# Patient Record
Sex: FEMALE | NOT HISPANIC OR LATINO | URBAN - METROPOLITAN AREA
[De-identification: names, ages, dates, MRNs, and addresses within clinical notes are randomized per-mention and may not be internally consistent; named-entity substitution may affect disease eponyms.]

---

## 2019-05-02 ENCOUNTER — OFFICE VISIT CONVERTED (OUTPATIENT)
Dept: OTHER | Facility: HOSPITAL | Age: 59
End: 2019-05-02
Attending: INTERNAL MEDICINE

## 2021-05-28 VITALS
HEIGHT: 60 IN | DIASTOLIC BLOOD PRESSURE: 68 MMHG | WEIGHT: 111.7 LBS | OXYGEN SATURATION: 92 % | SYSTOLIC BLOOD PRESSURE: 124 MMHG | HEART RATE: 88 BPM | RESPIRATION RATE: 18 BRPM | BODY MASS INDEX: 21.93 KG/M2

## 2021-05-28 NOTE — PROGRESS NOTES
Patient: TRINH JONES     Acct: [id number]     Report: #[id number]  UNIT #: [id number]     : 1960    Encounter Date:2019  PRIMARY CARE: [facility]URIC[facility]  ***Signed***  --------------------------------------------------------------------------------------------------------------------  DATE: 19      Primary Care Provider:  MARTY FORTE      Referring Provider:  MARTY FORTE      Reason For Consult      NP Consult- Anemia            History of Present Illness      53-year-old white female was referred from the hospital after discharge because     of anemia.  Patient claims that she has been hospitalized twice within the past     year.  Her last hospitalization was secondary to bilateral pneumonia review of     her hospital records revealed that the patient on  had a white count of     8.9, hemoglobin 9.2 hematocrit of 29.8, macrocytic indicis, elevated platelet     count of 514,000, normal differential.  Her CMP  showed BUN of 6, potassium 2.2,    carbon dioxide 36, creatinine 0.47, calcium 7.5, total protein 7, albumin 3.4.      On 2019 patient  had a 25-hydroxy vitamin D level of 5 normal being .            Past Medical/Surgical History             No Hypertension             No Diabetes Mellitus             No Heart Disease             No Blood Clots             No Cancer             No Lung Disease             No Kidney Disease            Herniorrhaphy      Colon surgery for benign tumor      CLINT/BSO      4 childbirths      Cataract surgery            Pyschiatric History      Depression, Anxiety, Panic Attacks; No Bipolar, No Other            Social History      Social History:  Tobacco Use (2 ppd); No Alcohol Use, No Recreational Drug use,     No Other      Patient takes care of 11 cats and 11 dogs in her house.            Family History      No Hypertension, No Diabetes Mellitus; Heart Disease (Mother, Maternal     Grandmother); No Blood Clots; Cancer (Maternal  Grandfather- Colon); No Lung     Disease, No Kidney Disease, No Other            Allergies      Coded Allergies:             NO KNOWN ALLERGIES (Unverified , 2/13/17)            Medications      Medications    Last Reconciled on 5/2/19 18:24 by IRENE JOHNSON MD      Ibuprofen (Ibuprofen) 800 Mg Tablet      800 MG PO BID, #30 TAB 0 Refills         Reported         5/2/19       Folic Acid (Folic Acid*) 1 Mg Tablet      1 MG PO QDAY for 90 Days, #90 TAB 3 Refills         Prov: Veza,Julia         5/2/19       Cyanocobalamin (Vitamin B-12*) 1,000 Mcg Tablet      1000 MCG PO QDAY for 90 Days, #90 TAB 3 Refills         Prov: Veza,[place]         5/2/19       Ergocalciferol (Ergocalciferol*) 50,000 Unit Capsule      78372 UNITS PO FR@09, #8 CAP 0 Refills         Prov: Veza,[place]         5/2/19       Baclofen (BACLOFEN) 10 Mg Tablet      10 MG PO BID, #60 TAB 0 Refills         Reported         5/2/19       Calcium Carbonate/Vitamin D3 (OYSCO 500-VIT D3 200 TABLET) 1 Each Tablet      500 MG PO QDAY, TABLET         Reported         12/20/16       DULoxetine (Cymbalta) 60 Mg Capsule.dr      60 MG PO QDAY, #30 CAP 0 Refills         Reported         12/20/16      Current Medications      Current Medications Reviewed 5/2/19            Review of Systems      Abnormal as noted below; all other systems have been reviewed and are negative.      General:  Anxiety, Fatigue Scale: (10), Pain Scale: (8)      HEENT:  No Dysphagia; Hearing Changes, Tinnitus, Visual Changes (Light hurts her    eyes)      Respiratory:  Cough, Shortness of Air, Sputum Changes (green)      Cardiovascular:  No Chest Pain, No Pedal Edema; Palpitations      Gastrointestinal:  Nausea, Vomiting; No Dysphagia; Constipation, Appetite Good     (good)      Genitourinary:  Nocturia (2x); No Dysuria; Other (Frequent UTIs)      Musculoskeletal:  No Joint Effusions; Myalgias, Aches, Pains (eyes)      Endocrine:  No Heat Intolerance, No Cold Intolerance      Hematologic:   No Bleeding; Bruising (Easy)      Allergic/Immunologic:  No Hives, No Throat closing off      Psychological:  Anxiety, Depression      Neurological:  Headaches, Dizziness, Numbness ((R) Leg, foot)      Skin:  No Rash, No Open Wounds      Vitals:             Height 5 ft 0 in / 152.4 cm           Weight 111 lbs 11.2 oz / 50.912162 kg           BSA 1.46 m2           BMI 21.8 kg/m2           Pulse 88           Respirations 18           Blood Pressure 124/68           Pulse Oximetry 92%            Exam      Constitutional:  No acute distress, Conversant, Pleasant, Other (Unkempt)      Eyes:  Anicteric sclerae, Palpebral Conjunctivae (Pink), DUNCAN      HENT:  Oropharynx clear; No Erythema; Buccal mucosae (Pink)      Neck:  Supple, Full Range of Motion      Lungs:  Clear to Ausculation, Normal Respiratory Effort, Other (Diminished     breath sound)      Cardiovascular:  RRR; No Murmurs; Normal PMI; No Peripheral Edema      Abdomen:  Soft, NABS; No Masses, No Tenderness      Skin:  Normal temperature, Other (No dermatome)      Extremities:  No digital cyanosis, No digital ischemia, Pedal pulses intact,     Normal gait, Other (No deformity)      Psychiatric:  Appropriate affect, Intact judgement, AAO x 3      Neurological:  Cranial Nerve II-XII; No Focal Sensory deficits      Lymphatic:  No Neck            Lab Results      Dictated in history and physical            Impression/Problem List      Microcytic anemia may be nutritional, rule out thyroid dysfunction, rule out     myelodysplasia.  Patient has hepatomegaly and liver disease may  cause     macrocytosis.      Hepatomegaly on x-rays      Possible COPD            Plan      Patient was instructed to take vitamin B12 thousand micrograms per day as well     as folic acid 1 mg/day.  Next today she will have a CBC, CMP, reticulocyte     count, B12, folate level, thyroid profile, methylmalonic acid, homocystine     level.      Return in 8 weeks with CBC, reticulocyte count,  methylmalonic acid, homocystine     level.      Thank you very much for allowing me to participate in the care of your patient.     I will keep you posted on the progress of her workup.            Patient Education:        Anxiety and Panic Attacks (Alternative Therapy)            PREVENTION      2 or More Falls Past Year?:  No      Fall Past Year with Injury?:  No      Chart initiated by      HILLARY Cortez MA                 Disclaimer: Converted document may not contain table formatting or lab diagrams. Please see [employer] for the authenticated document.

## 2023-07-10 ENCOUNTER — TELEPHONE (OUTPATIENT)
Dept: NEUROLOGY | Facility: CLINIC | Age: 63
End: 2023-07-10

## 2023-07-31 ENCOUNTER — TELEPHONE (OUTPATIENT)
Dept: NEUROSURGERY | Facility: CLINIC | Age: 63
End: 2023-07-31
Payer: COMMERCIAL

## 2023-08-22 ENCOUNTER — OFFICE VISIT (OUTPATIENT)
Dept: NEUROLOGY | Facility: CLINIC | Age: 63
End: 2023-08-22
Payer: COMMERCIAL

## 2023-08-22 VITALS
DIASTOLIC BLOOD PRESSURE: 95 MMHG | HEIGHT: 60 IN | SYSTOLIC BLOOD PRESSURE: 117 MMHG | WEIGHT: 97 LBS | HEART RATE: 87 BPM | BODY MASS INDEX: 19.04 KG/M2

## 2023-08-22 DIAGNOSIS — G43.009 MIGRAINE WITHOUT AURA AND WITHOUT STATUS MIGRAINOSUS, NOT INTRACTABLE: Primary | ICD-10-CM

## 2023-08-22 PROCEDURE — 99203 OFFICE O/P NEW LOW 30 MIN: CPT | Performed by: PSYCHIATRY & NEUROLOGY

## 2023-08-22 RX ORDER — BACLOFEN 20 MG/1
10 TABLET ORAL 3 TIMES DAILY
COMMUNITY
Start: 2023-04-18

## 2023-08-22 RX ORDER — PAROXETINE HYDROCHLORIDE 20 MG/1
20 TABLET, FILM COATED ORAL EVERY MORNING
COMMUNITY
Start: 2023-06-16 | End: 2024-06-16

## 2023-08-22 RX ORDER — PROGESTERONE 100 MG/1
100 CAPSULE ORAL DAILY
COMMUNITY
Start: 2023-06-01 | End: 2024-06-01

## 2023-08-22 RX ORDER — IBUPROFEN 800 MG/1
800 TABLET ORAL EVERY 6 HOURS PRN
COMMUNITY
Start: 2023-07-13

## 2023-08-22 RX ORDER — OMEGA-3-ACID ETHYL ESTERS 1 G/1
2 CAPSULE, LIQUID FILLED ORAL DAILY
COMMUNITY
Start: 2023-07-31 | End: 2024-07-31

## 2023-08-22 RX ORDER — FREMANEZUMAB-VFRM 225 MG/1.5ML
225 INJECTION SUBCUTANEOUS
Qty: 1.68 ML | Refills: 5 | Status: SHIPPED | OUTPATIENT
Start: 2023-08-22

## 2023-08-22 NOTE — PROGRESS NOTES
"Chief Complaint  Dizziness and Altered Mental Status (/)    Subjective          Zayra Mcpherson is a 62 y.o. female who presents to [facility]  History of Present Illness  62-year-old woman evaluated for chronic headaches.  She states that she has had headaches since she was about 14 to 15 years old.  She states that her family has migraines including her [place] and parents.  She states that she gets a headache that is severe at least twice a month that can last for 3 to 4 days.  Headaches are usually frontal right greater than left and it is worse in her eyes.  It is described as pressure like something is pushing on her head and at times it can be throbbing in her temples.  There is associated light noise sensitivity and sometimes nausea.  She is incapacitated.  She gets a moderate headache on a daily basis and she takes Excedrin Migraine for this.  It usually will relieve it but it comes back again.  She states that she was taking Topamax in the past that was given to her by a neurologist.  It did not help her significantly.  She is taking Paxil at this time for depression.  She has a history of asthma and COPD.  She states that she has problems sleeping at night and she has sinus disease and keeps on sniffling.  She states that she needs to see an ENT.  Objective   Vital Signs:   /95   Pulse 87   Ht 152.4 cm (60\")   Wt 44 kg (97 lb)   BMI 18.94 kg/mý     Physical Exam   She is alert, fluent, phasic, follows commands well.  Optic is a normal, visual fields full, EOMs full directions gaze, facial strength is full, soft palate elevation and tongue are normal.  There is no weakness of the upper or lower extremities.  Reflexes are absent.  Cerebellar testing is intact.  Station gait is unremarkable.  Heart is regular rhythm normal in rate.        Assessment and Plan  Diagnoses and all orders for this visit:    1. Migraine without aura and without status " migrainosus, not intractable (Primary)  Assessment & Plan:  I discussed with her that she has chronic migraine headaches and she needs to stop taking Excedrin since it can cause medication overuse headaches.  She cannot take another antidepressant medication such as amitriptyline and she cannot take beta-blockers because of asthma and COPD.  She has tried topiramate in the past which was not effective for her.  I will give her Ajovy injection.  I explained to her the adverse effects of the medication and gave her sample in our office.  She is to self inject herself in the next month and I will see her again in 3 weeks time for follow-up.  She is to stop taking Excedrin or limited to not more than 4 times a month.  I will complete the work-up by doing a CT scan of the brain.  Thank you for letting me participate in her care.        Other orders  -     Fremanezumab-vfrm (Ajovy) 225 MG/1.5ML solution auto-injector; Inject 225 mg under the skin into the appropriate area as directed Every 30 (Thirty) Days.  Dispense: 1.68 mL; Refill: 5         Total time spent with the patient and coordinating patient care was 35 minutes.    Follow Up  No follow-ups on file.  Patient was given instructions and counseling regarding her condition or for health maintenance advice. Please see specific information pulled into the AVS if appropriate.

## 2023-08-22 NOTE — ASSESSMENT & PLAN NOTE
I discussed with her that she has chronic migraine headaches and she needs to stop taking Excedrin since it can cause medication overuse headaches.  She cannot take another antidepressant medication such as amitriptyline and she cannot take beta-blockers because of asthma and COPD.  She has tried topiramate in the past which was not effective for her.  I will give her Ajovy injection.  I explained to her the adverse effects of the medication and gave her sample in our office.  She is to self inject herself in the next month and I will see her again in 3 weeks time for follow-up.  She is to stop taking Excedrin or limited to not more than 4 times a month.  I will complete the work-up by doing a CT scan of the brain.  Thank you for letting me participate in her care.

## 2023-08-23 ENCOUNTER — OFFICE VISIT (OUTPATIENT)
Dept: NEUROSURGERY | Facility: CLINIC | Age: 63
End: 2023-08-23
Payer: COMMERCIAL

## 2023-08-23 VITALS
SYSTOLIC BLOOD PRESSURE: 134 MMHG | BODY MASS INDEX: 19.1 KG/M2 | WEIGHT: 97.3 LBS | HEART RATE: 70 BPM | HEIGHT: 60 IN | DIASTOLIC BLOOD PRESSURE: 84 MMHG

## 2023-08-23 DIAGNOSIS — M47.814 THORACIC SPONDYLOSIS WITHOUT MYELOPATHY: ICD-10-CM

## 2023-08-23 DIAGNOSIS — M47.817 SPONDYLOSIS OF LUMBOSACRAL REGION WITHOUT MYELOPATHY OR RADICULOPATHY: Primary | ICD-10-CM

## 2023-08-23 DIAGNOSIS — M41.50 SCOLIOSIS DUE TO DEGENERATIVE DISEASE OF SPINE IN ADULT PATIENT: ICD-10-CM

## 2023-08-23 DIAGNOSIS — M25.551 BILATERAL HIP PAIN: ICD-10-CM

## 2023-08-23 DIAGNOSIS — M25.552 BILATERAL HIP PAIN: ICD-10-CM

## 2023-08-23 NOTE — PROGRESS NOTES
"Chief Complaint  Back Pain (Back pain that radiates into right hip and into right knee. )    Subjective          Zayra Mcpherson who is a 62 y.o. year old female who presents to [facility] for evaluation of thoracic and lumbar spine.      The patient complains of pain located in the lumbar spine.  Patients states the pain has been present for several years.  The pain came on gradually.  Pain worsened since 2016. The pain scale level is 5.  The pain does radiate. Dermatomes are located on right Lumbar at: into the hip, buttocks and occasionally to the knee..  The pain is waxing/waning and described as aching.  The pain is worse at no particular time of day. Patient states prolonged sitting, bending, and twisting makes the pain worse.  Patient states rest makes the pain better.    Associated Symptoms Include: Numbness. She will have numbness in the right leg with prolonged standing and walking.    Conservative Interventions Include: Physical Therapy that was not very effective. She is currently receiving acupuncture which is providing her a lot of improvement in her pain. She is prescribed Ibuprofen though this does not help. She is followed by pain clinic in [place]. Prescribed a compound pain cream which did not help. She was also prescribed voltaren gel which did not help.     Was this the result of an injury or accident? : No    History of Previous Spinal Surgery?: No    Nicotine use:  smoker  (0.5 ppd)    BMI: Body mass index is 19 kg/mý.      Review of Systems   Musculoskeletal:  Positive for arthralgias, back pain, gait problem and bursitis.   Skin:  Positive for dry skin.   All other systems reviewed and are negative.     Objective   Vital Signs:   /84 (BP Location: Right arm)   Pulse 70   Ht 152.4 cm (60\")   Wt 44.1 kg (97 lb 4.8 oz)   BMI 19.00 kg/mý       Physical Exam  Vitals reviewed.   Constitutional:       Appearance: Normal appearance. "   Musculoskeletal:      Lumbar back: Tenderness present. Negative right straight leg raise test and negative left straight leg raise test.      Right hip: Tenderness present. Decreased range of motion.      Left hip: Tenderness present. Decreased range of motion.   Neurological:      Mental Status: She is alert and oriented to person, place, and time.      Motor: Motor strength is normal.      Gait: Gait is intact.      Deep Tendon Reflexes:      Reflex Scores:       Patellar reflexes are 2+ on the right side and 2+ on the left side.       Achilles reflexes are 0 on the right side and 0 on the left side.     Neurologic Exam     Mental Status   Oriented to person, place, and time.   Level of consciousness: alert    Motor Exam   Muscle bulk: normal  Overall muscle tone: normal    Strength   Strength 5/5 throughout.     Sensory Exam   Light touch normal.     Gait, Coordination, and Reflexes     Gait  Gait: normal    Reflexes   Right patellar: 2+  Left patellar: 2+  Right achilles: 0  Left achilles: 0  Right ankle clonus: absent  Left ankle clonus: absent     Result Review :       Data reviewed : Radiologic studies MRI Lumbar Spine on 6/26/23 at [facility] personally reviewed. Multilevel spondylosis with mild dextroscoliosis centered at L3. At L3/4 there is a disc bulge combined with facet arthropathy resulting in moderate spinal stenosis, moderate right and severe left foraminal stenosis. At L4/5 there is mild spinal stenosis with severe right foraminal stenosis and effacement of the right L4 nerve root. These are the most notable findings.        MRI Thoracic Spine on 6/26/23 at [facility] personally reviewed. Multilevel spondylosis. At T8/9 there is mild spinal stenosis. No cord compression or cord signal change.     Assessment and Plan    Diagnoses and all orders for this visit:    1. Spondylosis of lumbosacral region without myelopathy or radiculopathy (Primary)    2. Thoracic spondylosis without  myelopathy    3. Scoliosis due to degenerative disease of spine in adult patient    4. Bilateral hip pain  -     Cancel: XR Hips Bilateral With or Without Pelvis 3-4 View; Future  -     XR Hips Bilateral With or Without Pelvis 3-4 View; Future    Pt presenting for evaluation of chronic low back and hip pain. We reviewed her MRI Thoracic and Lumbar Spine, demonstrating multilevel spondylosis. There is dextroscoliosis centered at L3, attributed to degenerative changes in the lumbar spine with spinal stenosis at L3/4. Her exam demonstrates tenderness to palpation in the lower lumbar spine and hips, with pain with rotation of the hips. Will order XR Hips to evaluate for arthritis.     We discussed that spine surgery would likely not improve her pain symptoms.     She has multilevel advanced degenerative changes. We discussed that risk factors would include her smoking history, prior work history. Genetic history could also play a role.     We discussed the importance of core strengthening, avoidance of activities that worsen the pain, nicotine cessation and maintenance of healthy weight. Surgery for patients with multilevel degenerative disc disease is not typically successful with the risks outweighing the benefit.     We discussed the importance of smoking/nicotine cessation. Smoking/nicotine use has multiple health risks. In particular related to the spine, nicotine increases the incidence of lower back pain, speeds up the progression of degenerative disc disease and dramatically reduces healing after spine surgery (particularly a fusion operation).     She has not been able to work since 2016 due to her chronic pain. We discussed that due to the advanced degenerative changes in her spine we would not recommend any type of work that requires heavy lifting, bending, twisting, jarring or strenuous activity.     She will continue with pain management. I believe she could potentially benefit from lumbar RFA. Would  consider referral to Orthopedics for evaluation of the hip.     She will follow up in our office as needed.       Follow Up   Return if symptoms worsen or fail to improve.  Patient was given instructions and counseling regarding her condition or for health maintenance advice.     -XR Hips  -Continue with pain management  -Continue acupuncture  -Follow up as needed

## 2023-09-20 ENCOUNTER — HOSPITAL ENCOUNTER (OUTPATIENT)
Dept: GENERAL RADIOLOGY | Facility: HOSPITAL | Age: 63
Discharge: HOME | End: 2023-09-20
Payer: COMMERCIAL

## 2023-09-20 ENCOUNTER — HOSPITAL ENCOUNTER (OUTPATIENT)
Dept: CT IMAGING | Facility: HOSPITAL | Age: 63
Discharge: HOME | End: 2023-09-20
Payer: COMMERCIAL

## 2023-09-20 DIAGNOSIS — G43.009 MIGRAINE WITHOUT AURA AND WITHOUT STATUS MIGRAINOSUS, NOT INTRACTABLE: ICD-10-CM

## 2023-09-20 DIAGNOSIS — M25.551 BILATERAL HIP PAIN: ICD-10-CM

## 2023-09-20 DIAGNOSIS — M25.552 BILATERAL HIP PAIN: ICD-10-CM

## 2023-09-20 PROCEDURE — 70450 CT HEAD/BRAIN W/O DYE: CPT

## 2023-09-20 PROCEDURE — 73522 X-RAY EXAM HIPS BI 3-4 VIEWS: CPT

## 2024-10-28 ENCOUNTER — TELEPHONE (OUTPATIENT)
Dept: GASTROENTEROLOGY | Facility: CLINIC | Age: 64
End: 2024-10-28
Payer: COMMERCIAL